# Patient Record
Sex: FEMALE | Race: WHITE | Employment: FULL TIME | URBAN - NONMETROPOLITAN AREA
[De-identification: names, ages, dates, MRNs, and addresses within clinical notes are randomized per-mention and may not be internally consistent; named-entity substitution may affect disease eponyms.]

---

## 2021-10-07 ENCOUNTER — OFFICE VISIT (OUTPATIENT)
Dept: ENT CLINIC | Age: 71
End: 2021-10-07
Payer: MEDICARE

## 2021-10-07 VITALS
BODY MASS INDEX: 33.45 KG/M2 | RESPIRATION RATE: 20 BRPM | HEIGHT: 63 IN | SYSTOLIC BLOOD PRESSURE: 100 MMHG | WEIGHT: 188.8 LBS | TEMPERATURE: 97.2 F | DIASTOLIC BLOOD PRESSURE: 64 MMHG | HEART RATE: 68 BPM

## 2021-10-07 DIAGNOSIS — D33.3 LEFT ACOUSTIC NEUROMA (HCC): Primary | ICD-10-CM

## 2021-10-07 DIAGNOSIS — H61.23 IMPACTED CERUMEN, BILATERAL: ICD-10-CM

## 2021-10-07 DIAGNOSIS — H90.3 ASYMMETRIC SNHL (SENSORINEURAL HEARING LOSS): ICD-10-CM

## 2021-10-07 PROCEDURE — G8484 FLU IMMUNIZE NO ADMIN: HCPCS | Performed by: NURSE PRACTITIONER

## 2021-10-07 PROCEDURE — 3017F COLORECTAL CA SCREEN DOC REV: CPT | Performed by: NURSE PRACTITIONER

## 2021-10-07 PROCEDURE — G8417 CALC BMI ABV UP PARAM F/U: HCPCS | Performed by: NURSE PRACTITIONER

## 2021-10-07 PROCEDURE — G8400 PT W/DXA NO RESULTS DOC: HCPCS | Performed by: NURSE PRACTITIONER

## 2021-10-07 PROCEDURE — 1090F PRES/ABSN URINE INCON ASSESS: CPT | Performed by: NURSE PRACTITIONER

## 2021-10-07 PROCEDURE — 1036F TOBACCO NON-USER: CPT | Performed by: NURSE PRACTITIONER

## 2021-10-07 PROCEDURE — 69210 REMOVE IMPACTED EAR WAX UNI: CPT | Performed by: NURSE PRACTITIONER

## 2021-10-07 PROCEDURE — 4040F PNEUMOC VAC/ADMIN/RCVD: CPT | Performed by: NURSE PRACTITIONER

## 2021-10-07 PROCEDURE — 99202 OFFICE O/P NEW SF 15 MIN: CPT | Performed by: NURSE PRACTITIONER

## 2021-10-07 PROCEDURE — G8428 CUR MEDS NOT DOCUMENT: HCPCS | Performed by: NURSE PRACTITIONER

## 2021-10-07 PROCEDURE — 1123F ACP DISCUSS/DSCN MKR DOCD: CPT | Performed by: NURSE PRACTITIONER

## 2021-10-07 NOTE — PROGRESS NOTES
chron's disease    H/O Crohn's disease     Type II or unspecified type diabetes mellitus without mention of complication, not stated as uncontrolled       Past Surgical History:   Procedure Laterality Date    APPENDECTOMY      BREAST SURGERY      rt lumpectomy    CHOLECYSTECTOMY  2012    fabiano Khan    COLONOSCOPY  2010    HYSTERECTOMY      OVARY REMOVAL      x 2 surgeries    TUBAL LIGATION      UPPER GASTROINTESTINAL ENDOSCOPY  2011     Family History   Problem Relation Age of Onset    Cancer Mother         ovarian    Heart Disease Father      Social History     Tobacco Use    Smoking status: Former Smoker     Packs/day: 2.00     Types: Cigarettes     Quit date: 3/27/2009     Years since quittin.5    Smokeless tobacco: Never Used   Substance Use Topics    Alcohol use: No        Subjective:      Review of Systems  Rest of review of systems are negative, except as noted in HPI. Objective:     /64 (Site: Left Upper Arm)   Pulse 68   Temp 97.2 °F (36.2 °C) (Temporal)   Resp 20   Ht 5' 2.5\" (1.588 m)   Wt 188 lb 12.8 oz (85.6 kg)   BMI 33.98 kg/m²     PHYSICAL EXAM  Constitutional: Oriented to person, place, and time. appears well-developed and well-nourished. No distress. HENT:   Head: Normocephalic and atraumatic. Right Ear:  External ear normal. Cerumen removed with suction under magnification. Tympanic membrane intact. Middle ear aerated. Left Ear:  External ear normal. Cerumen removed with suction under magnification. Tympanic membrane intact. Middle ear aerated. Vitals reviewed. Data:  All of the past medical history, past surgical history, family history,social history, allergies and current medications were reviewed with the patient. Assessment & Plan   Diagnoses and all orders for this visit:     Diagnosis Orders   1. Left acoustic neuroma ([address]     2. Asymmetric SNHL (sensorineural hearing loss), left     3.  Impacted cerumen,

## 2022-03-03 ENCOUNTER — OFFICE VISIT (OUTPATIENT)
Dept: ENT CLINIC | Age: 72
End: 2022-03-03
Payer: MEDICARE

## 2022-03-03 VITALS
WEIGHT: 189 LBS | SYSTOLIC BLOOD PRESSURE: 114 MMHG | DIASTOLIC BLOOD PRESSURE: 50 MMHG | RESPIRATION RATE: 14 BRPM | HEART RATE: 78 BPM | BODY MASS INDEX: 34.02 KG/M2 | TEMPERATURE: 97.1 F

## 2022-03-03 DIAGNOSIS — H61.23 IMPACTED CERUMEN, BILATERAL: Primary | ICD-10-CM

## 2022-03-03 PROCEDURE — 69210 REMOVE IMPACTED EAR WAX UNI: CPT | Performed by: NURSE PRACTITIONER

## 2022-03-03 RX ORDER — AMLODIPINE BESYLATE 2.5 MG/1
2.5 TABLET ORAL DAILY
COMMUNITY
Start: 2022-01-17

## 2022-03-03 RX ORDER — DICYCLOMINE HYDROCHLORIDE 10 MG/1
10 CAPSULE ORAL 3 TIMES DAILY PRN
COMMUNITY
Start: 2022-03-02

## 2022-04-04 NOTE — PROGRESS NOTES
Procedure note:  DATE AND TIME OF PROCEDURE: 4/3/2022 8:03 PM  PATIENT NAME: Anita Marsh  MRN [id number]  PROVIDER: THEO Chandra CNP   PRE-PROCEDURE DIAGNOSIS:  Bilateral cerumen impaction  POST-PROCEDURE DIAGNOSIS:   Same     INDICATION: Cerumenosis causing an inability to visualize the tympanic membrane, middle ear space and/or external auditory canal.     TEACHING: Procedure, benefits, and risks were explained to patient/family. Verbal informed consent was obtained. TIME OUT: A time out was conducted immediately before starting the procedure that confirmed a final verification of the correct patient, correct procedure, correct patient position, correct site and availability of special equipment. DESCRIPTION OF PROCEDURE:  PROCEDURE: Cerumenectomy  SITE: Bilateral ear(s)    ANESTHESIA:  NONE  COMPLICATIONS: NONE  EBL: NONE    PROCEDURE: Handheld otoscope used to visualize EAC. Bilateral cerumen in place, obstructing visualization of tympanic membranes. Cerumen removed with wire loop and alligator forceps until tympanic membranes could be visualized as documented above. Normal exam. The patient tolerated the procedure well, with no complications.

## 2022-09-07 ENCOUNTER — OFFICE VISIT (OUTPATIENT)
Dept: ENT CLINIC | Age: 72
End: 2022-09-07
Payer: MEDICARE

## 2022-09-07 VITALS
RESPIRATION RATE: 16 BRPM | OXYGEN SATURATION: 96 % | BODY MASS INDEX: 33.89 KG/M2 | DIASTOLIC BLOOD PRESSURE: 70 MMHG | HEART RATE: 64 BPM | WEIGHT: 191.3 LBS | SYSTOLIC BLOOD PRESSURE: 124 MMHG | TEMPERATURE: 97.9 F | HEIGHT: 63 IN

## 2022-09-07 DIAGNOSIS — H61.22 IMPACTED CERUMEN OF LEFT EAR: Primary | ICD-10-CM

## 2022-09-07 DIAGNOSIS — D33.3 LEFT ACOUSTIC NEUROMA (HCC): ICD-10-CM

## 2022-09-07 DIAGNOSIS — Z97.4 WEARS HEARING AID IN LEFT EAR: ICD-10-CM

## 2022-09-07 PROCEDURE — G8417 CALC BMI ABV UP PARAM F/U: HCPCS | Performed by: PHYSICIAN ASSISTANT

## 2022-09-07 PROCEDURE — 1090F PRES/ABSN URINE INCON ASSESS: CPT | Performed by: PHYSICIAN ASSISTANT

## 2022-09-07 PROCEDURE — 99212 OFFICE O/P EST SF 10 MIN: CPT | Performed by: PHYSICIAN ASSISTANT

## 2022-09-07 PROCEDURE — G8427 DOCREV CUR MEDS BY ELIG CLIN: HCPCS | Performed by: PHYSICIAN ASSISTANT

## 2022-09-07 PROCEDURE — G8400 PT W/DXA NO RESULTS DOC: HCPCS | Performed by: PHYSICIAN ASSISTANT

## 2022-09-07 PROCEDURE — 1036F TOBACCO NON-USER: CPT | Performed by: PHYSICIAN ASSISTANT

## 2022-09-07 PROCEDURE — 1123F ACP DISCUSS/DSCN MKR DOCD: CPT | Performed by: PHYSICIAN ASSISTANT

## 2022-09-07 PROCEDURE — 69210 REMOVE IMPACTED EAR WAX UNI: CPT | Performed by: PHYSICIAN ASSISTANT

## 2022-09-07 PROCEDURE — 3017F COLORECTAL CA SCREEN DOC REV: CPT | Performed by: PHYSICIAN ASSISTANT

## 2022-09-07 NOTE — PROGRESS NOTES
[facility] [place] [facility]  [facility] EAR, NOSE AND THROAT  [address]  Dept: [phone]  Dept Fax: [phone]  Loc: Phan Giang is a 66 y.o. female who was referred by No ref. provider found for:  Chief Complaint   Patient presents with    Follow-up     Patient is here for a f/u for ear cleaning. Tejal Flood HPI:     Patient presents for ear check. She reports that she has been doing well since she was last seen. No otalgia, otorrhea, fevers, chills. She continues to use her hearing aid on the left and she thinks she might have cerumen building up near the end of the hearing aid. She has asymmetrical hearing loss secondary to acoustic neuroma. She saw otology for this, but she was not interested in surgery. She denies any significant change to hearing that she is aware. No vertigo reported. No other symptoms or concerns at this time. Subjective:      REVIEW OF SYSTEMS:    A complete multi-organ review of systems was performed using a new patient questionnaire, and reviewed by me.   ENT:  negative except as noted in HPI  CONSTITUTIONAL:  negative except as noted in HPI  EYES:  negative except as noted in HPI  RESPIRATORY:  negative except as noted in HPI  CARDIOVASCULAR:  negative except as noted in HPI  GASTROINTESTINAL:  negative except as noted in HPI  GENITOURINARY:  negative except as noted in HPI  MUSCULOSKELETAL:  negative except as noted in HPI  SKIN:  negative except as noted in HPI  ENDOCRINE/METABOLIC: negative except as noted in HPI  HEMATOLOGIC/LYMPHATIC:  negative except as noted in HPI  ALLERGY/IMMUN: negative except as noted in HPI  NEUROLOGICAL:  negative except as noted in HPI  BEHAVIOR/PSYCH:  negative except as noted in HPI    Past Medical History:  Past Medical History:   Diagnosis Date    Cancer ([facility] [address] 1987    rt breast cancer    COPD (chronic obstructive pulmonary disease) (HCC)     GERD (gastroesophageal reflux disease)     chron's disease    H/O Crohn's disease     Type II or unspecified type diabetes mellitus without mention of complication, not stated as uncontrolled        Social History:    TOBACCO:   reports that she quit smoking about 13 years ago. Her smoking use included cigarettes. She smoked an average of 2 packs per day. She has never used smokeless tobacco.  ETOH:   reports no history of alcohol use. DRUGS:   reports no history of drug use. Family History:       Problem Relation Age of Onset    Cancer Mother         ovarian    Heart Disease Father        Surgical History:  Past Surgical History:   Procedure Laterality Date    APPENDECTOMY      BREAST SURGERY  1987    rt lumpectomy    CHOLECYSTECTOMY  4/2012    fabiano Mullen Cutter    COLONOSCOPY  jan 2010    HYSTERECTOMY (CERVIX STATUS UNKNOWN)      OVARY REMOVAL      x 2 surgeries    TUBAL LIGATION      UPPER GASTROINTESTINAL ENDOSCOPY  nov 2011        Objective: This is a 66 y.o. female. Patient is alert and oriented to person, place and time. Patient appears well developed, well nourished. Mood is happy with normal affect. Not obviously hearing impaired. No abnormality in speech noted. /70 (Site: Right Upper Arm, Position: Sitting)   Pulse 64   Temp 97.9 °F (36.6 °C) (Infrared)   Resp 16   Ht 5' 2.5\" (1.588 m)   Wt 191 lb 4.8 oz (86.8 kg)   SpO2 96%   BMI 34.43 kg/m²     Head:   Normocephalic, atraumatic. No obvious masses or lesions noted. Ears:  External ears: Normal: no scars, lesions or masses. Mastoid process: No erythema noted. No tenderness to palpation. R External auditory canal: Normal amount of nonobstructive cerumen. Otherwise clear and free of any pathology  L External auditory canal: Cerumen impaction present. The impaction is very mild, but does restrict visualization of the medial canal and portion of the TM. The cerumen accumulated near the area where her hearing aid would end.  I removed the cerumen using alligator forceps and suction under handheld magnification. Tympanic membranes:  R intact, translucent                                                  L intact, translucent     Assessment/Plan:     Diagnosis Orders   1. Impacted cerumen of left ear        2. Wears hearing aid in left ear        3. Left acoustic neuroma ([address]            -Cerumen impaction on the left, which was removed as described above. She tolerated this well without evidence of complication  -She will follow up with Dr Mychal Martinez (her audiologist) as scheduled  -She will plan to tentatively follow up in about 1 year for ear check, but will call sooner if she feels like her ear is filled with wax or has other ENT concerns/symptoms.  Will consider re-imagining for monitoring of acoustic neuroma if she has sensation of hearing changes in future    (Please note that portions of this note may have been completed with a voice recognition program.  Efforts were made to edit the dictation but occasionally words are mis-transcribed.)    Electronically signed by POLO Flowers on 9/7/2022 at 2:04 PM

## 2023-09-07 ENCOUNTER — OFFICE VISIT (OUTPATIENT)
Dept: ENT CLINIC | Age: 73
End: 2023-09-07
Payer: MEDICARE

## 2023-09-07 VITALS
BODY MASS INDEX: 33.4 KG/M2 | OXYGEN SATURATION: 97 % | HEIGHT: 63 IN | RESPIRATION RATE: 20 BRPM | HEART RATE: 63 BPM | DIASTOLIC BLOOD PRESSURE: 62 MMHG | SYSTOLIC BLOOD PRESSURE: 130 MMHG | TEMPERATURE: 97.7 F | WEIGHT: 188.5 LBS

## 2023-09-07 DIAGNOSIS — D33.3 LEFT ACOUSTIC NEUROMA (HCC): ICD-10-CM

## 2023-09-07 DIAGNOSIS — H61.22 HEARING LOSS DUE TO CERUMEN IMPACTION, LEFT: Primary | ICD-10-CM

## 2023-09-07 DIAGNOSIS — H90.3 ASYMMETRIC SNHL (SENSORINEURAL HEARING LOSS): ICD-10-CM

## 2023-09-07 DIAGNOSIS — Z97.4 WEARS HEARING AID IN LEFT EAR: ICD-10-CM

## 2023-09-07 PROCEDURE — G8400 PT W/DXA NO RESULTS DOC: HCPCS | Performed by: PHYSICIAN ASSISTANT

## 2023-09-07 PROCEDURE — G8427 DOCREV CUR MEDS BY ELIG CLIN: HCPCS | Performed by: PHYSICIAN ASSISTANT

## 2023-09-07 PROCEDURE — 1036F TOBACCO NON-USER: CPT | Performed by: PHYSICIAN ASSISTANT

## 2023-09-07 PROCEDURE — 99212 OFFICE O/P EST SF 10 MIN: CPT | Performed by: PHYSICIAN ASSISTANT

## 2023-09-07 PROCEDURE — 3017F COLORECTAL CA SCREEN DOC REV: CPT | Performed by: PHYSICIAN ASSISTANT

## 2023-09-07 PROCEDURE — 1123F ACP DISCUSS/DSCN MKR DOCD: CPT | Performed by: PHYSICIAN ASSISTANT

## 2023-09-07 PROCEDURE — G8417 CALC BMI ABV UP PARAM F/U: HCPCS | Performed by: PHYSICIAN ASSISTANT

## 2023-09-07 PROCEDURE — 69210 REMOVE IMPACTED EAR WAX UNI: CPT | Performed by: PHYSICIAN ASSISTANT

## 2023-09-07 PROCEDURE — 1090F PRES/ABSN URINE INCON ASSESS: CPT | Performed by: PHYSICIAN ASSISTANT

## 2023-09-07 RX ORDER — CALCIUM CARBONATE 500(1250)
500 TABLET ORAL DAILY
COMMUNITY

## 2023-09-07 RX ORDER — GLIMEPIRIDE 1 MG/1
1 TABLET ORAL
COMMUNITY

## 2023-09-07 RX ORDER — LEVOCETIRIZINE DIHYDROCHLORIDE 5 MG/1
5 TABLET, FILM COATED ORAL NIGHTLY
COMMUNITY

## 2023-09-07 RX ORDER — FINERENONE 10 MG/1
TABLET, FILM COATED ORAL
COMMUNITY

## 2023-09-07 RX ORDER — ATORVASTATIN CALCIUM 10 MG/1
10 TABLET, FILM COATED ORAL DAILY
COMMUNITY

## 2023-09-07 RX ORDER — DULAGLUTIDE 0.75 MG/.5ML
0.75 INJECTION, SOLUTION SUBCUTANEOUS WEEKLY
COMMUNITY

## 2023-09-07 NOTE — PROGRESS NOTES
[facility] [place] [facility]  [facility] EAR, NOSE AND THROAT  [address]   Dept: [phone]  Dept Fax: [phone]  Loc: [address] Giang is a 74 y.o. female who was referred by No ref. provider found for:  Chief Complaint   Patient presents with    Follow-up     Patient is here for 1 year f/u for ear cleaning. Patient states she is doing well since last visit. [place]en [place] HPI:     Maury Giang presents today for ear check. She reports that she has noticed a gradual decline in her hearing since she was last seen. She assumes it is related to cerumen build up as it has been in the past. She denies associated otalgia, otorrhea, fevers,chills. She has occasional positional dizziness/lightheadedness, but denies vertigo. She has underlying asymmetrical SNHL related to acoustic neuroma. She reports seeing neurology for this, but was not interested in further care. She does not think it affects her daily life beyond causing hearing loss on the left. She uses her hearing aid on the left routinely which overall seems to help. Subjective:      REVIEW OF SYSTEMS:    Pertinent positives as noted in the HPI. All other systems reviewed and negative.     ALLERGIES:  Sulfa antibiotics    Past Medical History:  Past Medical History:   Diagnosis Date    Cancer ([address] [facility]) 1987    rt breast cancer    COPD (chronic obstructive pulmonary disease) ([address])     GERD (gastroesophageal reflux disease)     chron's disease    H/O Crohn's disease     Type II or unspecified type diabetes mellitus without mention of complication, not stated as uncontrolled        PSM:  Past Surgical History:   Procedure Laterality Date    APPENDECTOMY      BREAST SURGERY  1987    rt lumpectomy    CHOLECYSTECTOMY  4/2012    fabiano tapia-Dr. Elissa Dumas    COLONOSCOPY  jan 2010    HYSTERECTOMY (CERVIX STATUS UNKNOWN)      OVARY REMOVAL      x 2 surgeries    TUBAL LIGATION      UPPER GASTROINTESTINAL